# Patient Record
Sex: MALE | Race: WHITE | HISPANIC OR LATINO | Employment: FULL TIME | URBAN - METROPOLITAN AREA
[De-identification: names, ages, dates, MRNs, and addresses within clinical notes are randomized per-mention and may not be internally consistent; named-entity substitution may affect disease eponyms.]

---

## 2021-01-18 ENCOUNTER — APPOINTMENT (OUTPATIENT)
Dept: RADIOLOGY | Facility: MEDICAL CENTER | Age: 36
DRG: 896 | End: 2021-01-18
Attending: EMERGENCY MEDICINE
Payer: COMMERCIAL

## 2021-01-18 ENCOUNTER — HOSPITAL ENCOUNTER (INPATIENT)
Facility: MEDICAL CENTER | Age: 36
LOS: 1 days | DRG: 896 | End: 2021-01-19
Attending: EMERGENCY MEDICINE | Admitting: STUDENT IN AN ORGANIZED HEALTH CARE EDUCATION/TRAINING PROGRAM
Payer: COMMERCIAL

## 2021-01-18 ENCOUNTER — APPOINTMENT (OUTPATIENT)
Dept: RADIOLOGY | Facility: MEDICAL CENTER | Age: 36
DRG: 896 | End: 2021-01-18
Attending: INTERNAL MEDICINE
Payer: COMMERCIAL

## 2021-01-18 PROBLEM — J96.02 ACUTE RESPIRATORY FAILURE WITH HYPOXIA AND HYPERCAPNIA (HCC): Status: ACTIVE | Noted: 2021-01-18

## 2021-01-18 PROBLEM — F10.920 ACUTE ALCOHOLIC INTOXICATION WITHOUT COMPLICATION (HCC): Status: ACTIVE | Noted: 2021-01-18

## 2021-01-18 PROBLEM — J96.01 ACUTE RESPIRATORY FAILURE WITH HYPOXIA AND HYPERCAPNIA (HCC): Status: ACTIVE | Noted: 2021-01-18

## 2021-01-18 LAB
ACTION RANGE TRIGGERED IACRT: YES
ALBUMIN SERPL BCP-MCNC: 4.3 G/DL (ref 3.2–4.9)
ALBUMIN/GLOB SERPL: 1.2 G/DL
ALP SERPL-CCNC: 56 U/L (ref 30–99)
ALT SERPL-CCNC: 22 U/L (ref 2–50)
AMPHET UR QL SCN: NEGATIVE
ANION GAP SERPL CALC-SCNC: 13 MMOL/L (ref 7–16)
AST SERPL-CCNC: 21 U/L (ref 12–45)
BARBITURATES UR QL SCN: NEGATIVE
BASE EXCESS BLDA CALC-SCNC: -3 MMOL/L (ref -4–3)
BASOPHILS # BLD AUTO: 0.8 % (ref 0–1.8)
BASOPHILS # BLD: 0.05 K/UL (ref 0–0.12)
BENZODIAZ UR QL SCN: NEGATIVE
BILIRUB SERPL-MCNC: <0.2 MG/DL (ref 0.1–1.5)
BODY TEMPERATURE: ABNORMAL DEGREES
BUN SERPL-MCNC: 8 MG/DL (ref 8–22)
BZE UR QL SCN: NEGATIVE
CALCIUM SERPL-MCNC: 8.6 MG/DL (ref 8.5–10.5)
CANNABINOIDS UR QL SCN: NEGATIVE
CHLORIDE SERPL-SCNC: 100 MMOL/L (ref 96–112)
CO2 BLDA-SCNC: 26 MMOL/L (ref 20–33)
CO2 SERPL-SCNC: 25 MMOL/L (ref 20–33)
CREAT SERPL-MCNC: 0.61 MG/DL (ref 0.5–1.4)
EOSINOPHIL # BLD AUTO: 0.1 K/UL (ref 0–0.51)
EOSINOPHIL NFR BLD: 1.6 % (ref 0–6.9)
ERYTHROCYTE [DISTWIDTH] IN BLOOD BY AUTOMATED COUNT: 47.8 FL (ref 35.9–50)
ETHANOL BLD-MCNC: 336.5 MG/DL (ref 0–10)
GLOBULIN SER CALC-MCNC: 3.6 G/DL (ref 1.9–3.5)
GLUCOSE SERPL-MCNC: 128 MG/DL (ref 65–99)
HCO3 BLDA-SCNC: 24.8 MMOL/L (ref 17–25)
HCT VFR BLD AUTO: 45.9 % (ref 42–52)
HGB BLD-MCNC: 15.6 G/DL (ref 14–18)
HOROWITZ INDEX BLDA+IHG-RTO: 232 MM[HG]
IMM GRANULOCYTES # BLD AUTO: 0.09 K/UL (ref 0–0.11)
IMM GRANULOCYTES NFR BLD AUTO: 1.4 % (ref 0–0.9)
INST. QUALIFIED PATIENT IIQPT: YES
LYMPHOCYTES # BLD AUTO: 3.22 K/UL (ref 1–4.8)
LYMPHOCYTES NFR BLD: 50.4 % (ref 22–41)
MCH RBC QN AUTO: 31.3 PG (ref 27–33)
MCHC RBC AUTO-ENTMCNC: 34 G/DL (ref 33.7–35.3)
MCV RBC AUTO: 92 FL (ref 81.4–97.8)
METHADONE UR QL SCN: NEGATIVE
MONOCYTES # BLD AUTO: 0.49 K/UL (ref 0–0.85)
MONOCYTES NFR BLD AUTO: 7.7 % (ref 0–13.4)
NEUTROPHILS # BLD AUTO: 2.44 K/UL (ref 1.82–7.42)
NEUTROPHILS NFR BLD: 38.1 % (ref 44–72)
NRBC # BLD AUTO: 0 K/UL
NRBC BLD-RTO: 0 /100 WBC
O2/TOTAL GAS SETTING VFR VENT: 50 %
OPIATES UR QL SCN: NEGATIVE
OXYCODONE UR QL SCN: NEGATIVE
PCO2 BLDA: 53.4 MMHG (ref 26–37)
PCP UR QL SCN: NEGATIVE
PH BLDA: 7.28 [PH] (ref 7.4–7.5)
PLATELET # BLD AUTO: 232 K/UL (ref 164–446)
PMV BLD AUTO: 9.4 FL (ref 9–12.9)
PO2 BLDA: 116 MMHG (ref 64–87)
POTASSIUM SERPL-SCNC: 3.9 MMOL/L (ref 3.6–5.5)
PROPOXYPH UR QL SCN: NEGATIVE
PROT SERPL-MCNC: 7.9 G/DL (ref 6–8.2)
RBC # BLD AUTO: 4.99 M/UL (ref 4.7–6.1)
SAO2 % BLDA: 98 % (ref 93–99)
SARS-COV-2 RNA RESP QL NAA+PROBE: NOTDETECTED
SODIUM SERPL-SCNC: 138 MMOL/L (ref 135–145)
SPECIMEN DRAWN FROM PATIENT: ABNORMAL
SPECIMEN SOURCE: NORMAL
WBC # BLD AUTO: 6.4 K/UL (ref 4.8–10.8)

## 2021-01-18 PROCEDURE — U0005 INFEC AGEN DETEC AMPLI PROBE: HCPCS

## 2021-01-18 PROCEDURE — 700101 HCHG RX REV CODE 250: Performed by: EMERGENCY MEDICINE

## 2021-01-18 PROCEDURE — 71045 X-RAY EXAM CHEST 1 VIEW: CPT

## 2021-01-18 PROCEDURE — 51702 INSERT TEMP BLADDER CATH: CPT

## 2021-01-18 PROCEDURE — 700111 HCHG RX REV CODE 636 W/ 250 OVERRIDE (IP): Performed by: INTERNAL MEDICINE

## 2021-01-18 PROCEDURE — 96365 THER/PROPH/DIAG IV INF INIT: CPT

## 2021-01-18 PROCEDURE — 303105 HCHG CATHETER EXTRA

## 2021-01-18 PROCEDURE — 99292 CRITICAL CARE ADDL 30 MIN: CPT | Performed by: INTERNAL MEDICINE

## 2021-01-18 PROCEDURE — 96366 THER/PROPH/DIAG IV INF ADDON: CPT

## 2021-01-18 PROCEDURE — 80307 DRUG TEST PRSMV CHEM ANLYZR: CPT

## 2021-01-18 PROCEDURE — 700111 HCHG RX REV CODE 636 W/ 250 OVERRIDE (IP)

## 2021-01-18 PROCEDURE — U0003 INFECTIOUS AGENT DETECTION BY NUCLEIC ACID (DNA OR RNA); SEVERE ACUTE RESPIRATORY SYNDROME CORONAVIRUS 2 (SARS-COV-2) (CORONAVIRUS DISEASE [COVID-19]), AMPLIFIED PROBE TECHNIQUE, MAKING USE OF HIGH THROUGHPUT TECHNOLOGIES AS DESCRIBED BY CMS-2020-01-R: HCPCS

## 2021-01-18 PROCEDURE — 96368 THER/DIAG CONCURRENT INF: CPT

## 2021-01-18 PROCEDURE — 85025 COMPLETE CBC W/AUTO DIFF WBC: CPT

## 2021-01-18 PROCEDURE — 304538 HCHG NG TUBE

## 2021-01-18 PROCEDURE — 80053 COMPREHEN METABOLIC PANEL: CPT

## 2021-01-18 PROCEDURE — 36600 WITHDRAWAL OF ARTERIAL BLOOD: CPT

## 2021-01-18 PROCEDURE — 82077 ASSAY SPEC XCP UR&BREATH IA: CPT

## 2021-01-18 PROCEDURE — 700105 HCHG RX REV CODE 258: Performed by: EMERGENCY MEDICINE

## 2021-01-18 PROCEDURE — 770022 HCHG ROOM/CARE - ICU (200)

## 2021-01-18 PROCEDURE — A9270 NON-COVERED ITEM OR SERVICE: HCPCS | Performed by: INTERNAL MEDICINE

## 2021-01-18 PROCEDURE — 94002 VENT MGMT INPAT INIT DAY: CPT

## 2021-01-18 PROCEDURE — 99291 CRITICAL CARE FIRST HOUR: CPT | Performed by: INTERNAL MEDICINE

## 2021-01-18 PROCEDURE — 700102 HCHG RX REV CODE 250 W/ 637 OVERRIDE(OP): Performed by: INTERNAL MEDICINE

## 2021-01-18 PROCEDURE — 302214 INTUBATION BOX: Performed by: EMERGENCY MEDICINE

## 2021-01-18 PROCEDURE — 99223 1ST HOSP IP/OBS HIGH 75: CPT | Performed by: STUDENT IN AN ORGANIZED HEALTH CARE EDUCATION/TRAINING PROGRAM

## 2021-01-18 PROCEDURE — 31500 INSERT EMERGENCY AIRWAY: CPT

## 2021-01-18 PROCEDURE — 82803 BLOOD GASES ANY COMBINATION: CPT

## 2021-01-18 PROCEDURE — 700101 HCHG RX REV CODE 250: Performed by: INTERNAL MEDICINE

## 2021-01-18 PROCEDURE — 99291 CRITICAL CARE FIRST HOUR: CPT

## 2021-01-18 PROCEDURE — 36415 COLL VENOUS BLD VENIPUNCTURE: CPT

## 2021-01-18 PROCEDURE — 96375 TX/PRO/DX INJ NEW DRUG ADDON: CPT

## 2021-01-18 RX ORDER — BISACODYL 10 MG
10 SUPPOSITORY, RECTAL RECTAL
Status: DISCONTINUED | OUTPATIENT
Start: 2021-01-18 | End: 2021-01-19 | Stop reason: HOSPADM

## 2021-01-18 RX ORDER — GAUZE BANDAGE 2" X 2"
100 BANDAGE TOPICAL DAILY
Status: DISCONTINUED | OUTPATIENT
Start: 2021-01-19 | End: 2021-01-19 | Stop reason: HOSPADM

## 2021-01-18 RX ORDER — ONDANSETRON 2 MG/ML
INJECTION INTRAMUSCULAR; INTRAVENOUS
Status: COMPLETED
Start: 2021-01-18 | End: 2021-01-18

## 2021-01-18 RX ORDER — POLYETHYLENE GLYCOL 3350 17 G/17G
1 POWDER, FOR SOLUTION ORAL
Status: DISCONTINUED | OUTPATIENT
Start: 2021-01-18 | End: 2021-01-19 | Stop reason: HOSPADM

## 2021-01-18 RX ORDER — AMOXICILLIN 250 MG
2 CAPSULE ORAL 2 TIMES DAILY
Status: DISCONTINUED | OUTPATIENT
Start: 2021-01-18 | End: 2021-01-19 | Stop reason: HOSPADM

## 2021-01-18 RX ORDER — ONDANSETRON 2 MG/ML
4 INJECTION INTRAMUSCULAR; INTRAVENOUS EVERY 4 HOURS PRN
Status: DISCONTINUED | OUTPATIENT
Start: 2021-01-18 | End: 2021-01-19 | Stop reason: HOSPADM

## 2021-01-18 RX ORDER — FAMOTIDINE 20 MG/1
20 TABLET, FILM COATED ORAL EVERY 12 HOURS
Status: DISCONTINUED | OUTPATIENT
Start: 2021-01-18 | End: 2021-01-19

## 2021-01-18 RX ORDER — ROCURONIUM BROMIDE 10 MG/ML
50 INJECTION, SOLUTION INTRAVENOUS ONCE
Status: COMPLETED | OUTPATIENT
Start: 2021-01-18 | End: 2021-01-18

## 2021-01-18 RX ORDER — FOLIC ACID 1 MG/1
1 TABLET ORAL DAILY
Status: DISCONTINUED | OUTPATIENT
Start: 2021-01-19 | End: 2021-01-19 | Stop reason: HOSPADM

## 2021-01-18 RX ORDER — SODIUM CHLORIDE 9 MG/ML
1000 INJECTION, SOLUTION INTRAVENOUS ONCE
Status: COMPLETED | OUTPATIENT
Start: 2021-01-18 | End: 2021-01-18

## 2021-01-18 RX ORDER — PROPOFOL 10 MG/ML
100 INJECTION, EMULSION INTRAVENOUS ONCE
Status: COMPLETED | OUTPATIENT
Start: 2021-01-18 | End: 2021-01-18

## 2021-01-18 RX ORDER — ACETAMINOPHEN 325 MG/1
650 TABLET ORAL EVERY 4 HOURS PRN
Status: DISCONTINUED | OUTPATIENT
Start: 2021-01-18 | End: 2021-01-19 | Stop reason: HOSPADM

## 2021-01-18 RX ORDER — IPRATROPIUM BROMIDE AND ALBUTEROL SULFATE 2.5; .5 MG/3ML; MG/3ML
3 SOLUTION RESPIRATORY (INHALATION)
Status: DISCONTINUED | OUTPATIENT
Start: 2021-01-18 | End: 2021-01-19 | Stop reason: HOSPADM

## 2021-01-18 RX ADMIN — ROCURONIUM BROMIDE 50 MG: 10 INJECTION, SOLUTION INTRAVENOUS at 04:32

## 2021-01-18 RX ADMIN — ENOXAPARIN SODIUM 40 MG: 40 INJECTION SUBCUTANEOUS at 10:04

## 2021-01-18 RX ADMIN — SODIUM CHLORIDE 1000 ML: 9 INJECTION, SOLUTION INTRAVENOUS at 04:00

## 2021-01-18 RX ADMIN — THIAMINE HYDROCHLORIDE: 100 INJECTION, SOLUTION INTRAMUSCULAR; INTRAVENOUS at 06:07

## 2021-01-18 RX ADMIN — FENTANYL CITRATE 100 MCG: 50 INJECTION, SOLUTION INTRAMUSCULAR; INTRAVENOUS at 11:40

## 2021-01-18 RX ADMIN — PROPOFOL 40 MCG/KG/MIN: 10 INJECTION, EMULSION INTRAVENOUS at 07:32

## 2021-01-18 RX ADMIN — ACETAMINOPHEN 650 MG: 325 TABLET, FILM COATED ORAL at 17:47

## 2021-01-18 RX ADMIN — ONDANSETRON 4 MG: 2 INJECTION INTRAMUSCULAR; INTRAVENOUS at 17:55

## 2021-01-18 RX ADMIN — FAMOTIDINE 20 MG: 10 INJECTION INTRAVENOUS at 17:14

## 2021-01-18 RX ADMIN — FAMOTIDINE 20 MG: 10 INJECTION INTRAVENOUS at 06:07

## 2021-01-18 RX ADMIN — FENTANYL CITRATE 50 MCG: 50 INJECTION, SOLUTION INTRAMUSCULAR; INTRAVENOUS at 08:11

## 2021-01-18 RX ADMIN — FENTANYL CITRATE 100 MCG: 50 INJECTION, SOLUTION INTRAMUSCULAR; INTRAVENOUS at 10:03

## 2021-01-18 RX ADMIN — PROPOFOL 40 MCG/KG/MIN: 10 INJECTION, EMULSION INTRAVENOUS at 11:33

## 2021-01-18 RX ADMIN — PROPOFOL 100 MG: 10 INJECTION, EMULSION INTRAVENOUS at 04:31

## 2021-01-18 RX ADMIN — PROPOFOL 40 MCG/KG/MIN: 10 INJECTION, EMULSION INTRAVENOUS at 04:45

## 2021-01-18 NOTE — ED TRIAGE NOTES
Patient found intoxicated at the [place].  Patient received 4mg zofran after vomiting.  Patient wakes up to speech.

## 2021-01-18 NOTE — RESPIRATORY CARE
Extubation       pt extubated per MD; cuff leak present and no stridor noted. pt place on 4LNC

## 2021-01-18 NOTE — CONSULTS
Critical Care Consultation    Date of consult: 1/18/2021    Referring Physician  Pushpa Handley M.D.    Reason for Consultation  Ventilator management for acute hypoxic and hypercapnic respiratory failure    History of Presenting Illness  All history was obtained from ER staff since the patient was intubated at the time of my evaluation.    Mr. Royal is a 35 year old male who was found highly intoxicated at the [facility] and brought in to the ER for evaluation.  While in the ER, he would wake to verbal and noxious stimuli, but was unable to stay awake.  He would have significant desaturations of his oxygen into the 80s when he was sleeping.  The patient had several episodes of emesis with the altered mental status prompting the ER physician to acutely intubate the patient for airway protection and hypoxia.    Code Status  FULL CODE    Review of Systems  Review of Systems   Unable to perform ROS: Intubated       Past Medical History  Unable to obtain as the patient is intubated  Surgical History  Unable to obtain as the patient is intubated  Family History  Unable to obtain as the patient is intubated  Social History    Unable to obtain as the patient is intubated    Medications  Home Medications    **Home medications have not yet been reviewed for this encounter**       Current Facility-Administered Medications   Medication Dose Route Frequency Provider Last Rate Last Admin   • NS infusion 1,000 mL  1,000 mL Intravenous Once Pushpa Handley M.D.       • PROPOFOL 10 MG/ML IV EMUL            • rocuronium (ZEMURON) injection 50 mg  50 mg Intravenous Once Pushpa Handley M.D.       • propofol (DIPRIVAN) injection  100 mg Intravenous Once Pushpa Handley M.D.       • propofol (DIPRIVAN) injection  0-80 mcg/kg/min Intravenous Continuous Pushpa Handley M.D.       • Respiratory Therapy Consult   Nebulization Continuous RT Haylie Quinn M.D.       • ipratropium-albuterol (DUONEB) nebulizer solution  3 mL  Nebulization Q2HRS PRN (RT) Haylie Quinn M.D.       • famotidine (PEPCID) tablet 20 mg  20 mg Enteral Tube Q12HRS Haylie Quinn M.D.        Or   • famotidine (PEPCID) injection 20 mg  20 mg Intravenous Q12HRS Haylie Quinn M.D.       • senna-docusate (PERICOLACE or SENOKOT S) 8.6-50 MG per tablet 2 Tab  2 Tab Enteral Tube BID Haylie Quinn M.D.        And   • polyethylene glycol/lytes (MIRALAX) PACKET 1 Packet  1 Packet Enteral Tube QDAY PRN Haylie Quinn M.D.        And   • magnesium hydroxide (MILK OF MAGNESIA) suspension 30 mL  30 mL Enteral Tube QDAY PRN Haylie Quinn M.D.        And   • bisacodyl (DULCOLAX) suppository 10 mg  10 mg Rectal QDAY PRN Haylie Quinn M.D.       • MD Alert...ICU Electrolyte Replacement per Pharmacy   Other PHARMACY TO DOSE Haylie Quinn M.D.       • lidocaine (XYLOCAINE) 1 % injection 1-2 mL  1-2 mL Tracheal Tube Q30 MIN PRN Haylie Quinn M.D.       • fentaNYL (SUBLIMAZE) injection 50 mcg  50 mcg Intravenous Q15 MIN PRN Haylie Quinn M.D.        And   • fentaNYL (SUBLIMAZE) injection 100 mcg  100 mcg Intravenous Q15 MIN PRN Haylie Quinn M.D.        And   • fentaNYL (SUBLIMAZE) 50 mcg/mL in 50mL (Continuous Infusion)   Intravenous Continuous Haylie Quinn M.D.        And   • propofol (DIPRIVAN) injection  0-40 mcg/kg/min Intravenous Continuous Haylie Quinn M.D.         No current outpatient medications on file.       Allergies  No Known Allergies    Vital Signs last 24 hours  Temp:  [35.8 °C (96.4 °F)] 35.8 °C (96.4 °F)  Pulse:  [102-108] 108  Resp:  [11-25] 25  BP: (151)/(88) 151/88  SpO2:  [100 %] 100 %    Physical Exam  Physical Exam  Vitals signs and nursing note reviewed.   Constitutional:       General: He is not in acute distress.     Appearance: He is obese. He is ill-appearing. He is not toxic-appearing.      Comments: Smells of vomitus, appears stated age   HENT:      Head: Normocephalic and atraumatic.      Right Ear:  External ear normal.      Left Ear: External ear normal.      Nose: Nose normal. No rhinorrhea.      Mouth/Throat:      Mouth: Mucous membranes are moist.      Comments: ETT in place  Eyes:      General: No scleral icterus.     Pupils: Pupils are equal, round, and reactive to light.      Comments: Injected conjunctivae   Neck:      Musculoskeletal: Normal range of motion and neck supple.   Cardiovascular:      Rate and Rhythm: Regular rhythm. Tachycardia present.      Pulses: Normal pulses.      Heart sounds: Normal heart sounds. No murmur.   Pulmonary:      Effort: No respiratory distress.      Breath sounds: Normal breath sounds. No wheezing.      Comments: Breathing comfortably on the vent  Chest:      Chest wall: No tenderness.   Abdominal:      General: There is no distension.      Palpations: Abdomen is soft.      Tenderness: There is no abdominal tenderness. There is no guarding or rebound.   Musculoskeletal: Normal range of motion.      Right lower leg: No edema.      Left lower leg: No edema.   Lymphadenopathy:      Cervical: No cervical adenopathy.   Skin:     General: Skin is warm.      Capillary Refill: Capillary refill takes less than 2 seconds.   Neurological:      Cranial Nerves: No cranial nerve deficit.      Sensory: No sensory deficit.      Motor: No weakness.      Comments: Moving all x 4, symmetrical facial expressions   Psychiatric:      Comments: Unable to assess         Fluids  No intake or output data in the 24 hours ending 01/18/21 0527    Laboratory  Recent Results (from the past 48 hour(s))   CBC WITH DIFFERENTIAL    Collection Time: 01/18/21  3:36 AM   Result Value Ref Range    WBC 6.4 4.8 - 10.8 K/uL    RBC 4.99 4.70 - 6.10 M/uL    Hemoglobin 15.6 14.0 - 18.0 g/dL    Hematocrit 45.9 42.0 - 52.0 %    MCV 92.0 81.4 - 97.8 fL    MCH 31.3 27.0 - 33.0 pg    MCHC 34.0 33.7 - 35.3 g/dL    RDW 47.8 35.9 - 50.0 fL    Platelet Count 232 164 - 446 K/uL    MPV 9.4 9.0 - 12.9 fL    Neutrophils-Polys  38.10 (L) 44.00 - 72.00 %    Lymphocytes 50.40 (H) 22.00 - 41.00 %    Monocytes 7.70 0.00 - 13.40 %    Eosinophils 1.60 0.00 - 6.90 %    Basophils 0.80 0.00 - 1.80 %    Immature Granulocytes 1.40 (H) 0.00 - 0.90 %    Nucleated RBC 0.00 /100 WBC    Neutrophils (Absolute) 2.44 1.82 - 7.42 K/uL    Lymphs (Absolute) 3.22 1.00 - 4.80 K/uL    Monos (Absolute) 0.49 0.00 - 0.85 K/uL    Eos (Absolute) 0.10 0.00 - 0.51 K/uL    Baso (Absolute) 0.05 0.00 - 0.12 K/uL    Immature Granulocytes (abs) 0.09 0.00 - 0.11 K/uL    NRBC (Absolute) 0.00 K/uL   COMP METABOLIC PANEL    Collection Time: 01/18/21  3:36 AM   Result Value Ref Range    Sodium 138 135 - 145 mmol/L    Potassium 3.9 3.6 - 5.5 mmol/L    Chloride 100 96 - 112 mmol/L    Co2 25 20 - 33 mmol/L    Anion Gap 13.0 7.0 - 16.0    Glucose 128 (H) 65 - 99 mg/dL    Bun 8 8 - 22 mg/dL    Creatinine 0.61 0.50 - 1.40 mg/dL    Calcium 8.6 8.5 - 10.5 mg/dL    AST(SGOT) 21 12 - 45 U/L    ALT(SGPT) 22 2 - 50 U/L    Alkaline Phosphatase 56 30 - 99 U/L    Total Bilirubin <0.2 0.1 - 1.5 mg/dL    Albumin 4.3 3.2 - 4.9 g/dL    Total Protein 7.9 6.0 - 8.2 g/dL    Globulin 3.6 (H) 1.9 - 3.5 g/dL    A-G Ratio 1.2 g/dL   DIAGNOSTIC ALCOHOL    Collection Time: 01/18/21  3:36 AM   Result Value Ref Range    Diagnostic Alcohol 336.5 (H) 0.0 - 10.0 mg/dL   ESTIMATED GFR    Collection Time: 01/18/21  3:36 AM   Result Value Ref Range    GFR If African American >60 >60 mL/min/1.73 m 2    GFR If Non African American >60 >60 mL/min/1.73 m 2       Imaging  CXR still pending    Assessment/Plan  * Acute respiratory failure with hypoxia and hypercapnia (HCC)- (present on admission)  Assessment & Plan  Intubated in ER due to hypoxia and not protecting airway  Cont full vent support  RT/O2 protocols  Vent bundle protocols  I am actively adjusting vent settings based on ABGs  SATs/SBTs  **?of underlying LEONIE    Acute alcoholic intoxication without complication (HCC)- (present on admission)  Assessment &  Plan  Unknown amount tonight and vomiting in ER  Monitor for alcohol withdrawal  Replete electrolytes as needed  Ana garcias      Discussed patient condition and risk of morbidity and/or mortality with Hospitalist, RN, RT and Pharmacy.    The patient remains critically ill requiring active adjustment of the ventilator and monitoring for alcohol withdrawal.  Critical care time = 40 minutes in directly providing and coordinating critical care and extensive data review.  No time overlap and excludes procedures.

## 2021-01-18 NOTE — ASSESSMENT & PLAN NOTE
Intubated in ER due to hypoxia and not protecting airway  Cont full vent support  RT/O2 protocols  Vent bundle protocols  I am actively adjusting vent settings based on ABGs  SATs/SBTs  **?of underlying LEONIE

## 2021-01-18 NOTE — ED NOTES
Clothing covered in vomit, removed and placed in belonging bag, necklace placed in biohazard bag and placed in bag with clothing.

## 2021-01-18 NOTE — ED PROVIDER NOTES
"ED Provider Note    CHIEF COMPLAINT  Chief Complaint   Patient presents with   • Alcohol Intoxication       HPI  Yomi Royal is a 35 y.o. male who presents to the emergency department with EMS for alcohol intoxication.  He was found drunk outside of the [facility] as he had a witnessed mild fall he did not hit his head security kind of helped him to the ground but they were unable to get him to stand on his own so EMS picked him up.  He has not been really responsive for them he will open his eyes to his name but that is about it he had a large volume emesis prior to arrival.    He will open his eyes here for me but is not talking he is given kind of yes and no answers no saying that he does not drink daily and that he does not take any medicines or have any allergies but other than that I did not get much history    REVIEW OF SYSTEMS  History limited secondary to alcohol intoxication  All other review of systems are negative    PAST MEDICAL HISTORY   Unable to assess    SOCIAL HISTORY unable to assess  Social History     Tobacco Use   • Smoking status: Not on file   Substance and Sexual Activity   • Alcohol use: Not on file   • Drug use: Not on file   • Sexual activity: Not on file       SURGICAL HISTORY  patient denies any surgical history unable to assess    CURRENT MEDICATIONS  Home Medications    **Home medications have not yet been reviewed for this encounter**     Unable to assess    ALLERGIES  Not on File    PHYSICAL EXAM  VITAL SIGNS: /88   Pulse (!) 102   Temp (!) 35.8 °C (96.4 °F) (Temporal)   Resp (!) 11   Ht 1.676 m (5' 6\")   Wt 113.4 kg (250 lb)   SpO2 100%   BMI 40.35 kg/m²    Pulse ox interpretation: I interpret this pulse ox as normal.  Constitutional: Disheveled covered in emesis smells highly of alcohol and somnolent  HENT: Normocephalic atraumatic, MMM  Eyes: PER, Conjunctiva normal, Non-icteric.   Neck: Normal range of motion, No tenderness, Supple, No stridor.  Snoring " respirations  Cardiovascular: Mildly tachycardic no murmurs.   Thorax & Lungs: Normal breath sounds, No respiratory distress, No wheezing, No chest tenderness.   Abdomen: Bowel sounds normal, Soft, No tenderness, No pulsatile masses. No peritoneal signs.  Skin: Warm, Dry, No erythema, No rash.   Back: No bony tenderness, No CVA tenderness.   Extremities/MSK: Intact equal distal pulses, No edema, No tenderness, No cyanosis, no major deformities noted  Neurologic: Somnolent will arouse to voice with tactile stimuli moving all extremities      DIFFERENTIAL DIAGNOSIS AND WORK UP PLAN    This is a 35 y.o. male who presents with alcohol intoxication with emesis prior to arrival he is not really talking he does have snoring somnolent episodes he is on nasal cannula because he was desatting when he felt very much asleep for EMS it could be concern for possible aspiration or just sleep apnea based on his body habitus  Were going to keep him on nasal cannula and advance as needed    DIAGNOSTIC STUDIES / PROCEDURES    EKG  No results found for this or any previous visit.    LABS  Pertinent Lab Findings  CBC within normal limits, CMP with glucose 128 without evidence of DKA diagnostic alcohol 336      RADIOLOGY  DX-CHEST-PORTABLE (1 VIEW)   Final Result         1.  Interstitial pulmonary parenchymal prominence, compatible with interstitial edema and/or infiltrates.        The radiologist's interpretation of all radiological studies have been reviewed by me.    Intubation Procedure    Indication: impending respiratory failure, comatose state and airway protection    Consent: Unable to be obtained due to the emergent nature of this procedure.    Medications Used: propofol intravenously and rocuronium intravenously    Procedure: The patient was placed in the appropriate position.  Cricoid pressure was not required.  Intubation was performed by direct laryngoscopy using a laryngoscope and an 8.0 cuffed endotracheal tube.  The cuff  was then inflated and the tube was secured appropriately at a distance of 24 cm to the dental ridge.  Initial confirmation of placement included bilateral breath sounds, an end tidal CO2 detector, absence of sounds over the stomach, tube fogging and adequate chest rise.  A chest x-ray to verify correct placement of the tube showed appropriate tube position.    The patient tolerated the procedure well.     Complications: mild desaturation to the mid 80's         COURSE & MEDICAL DECISION MAKING  Pertinent Labs & Imaging studies reviewed. (See chart for details)    3:57 AM  Reassessed patient the bedside he has been desaturating in the 50s and 60s he is all the way up to 15 L through his nasal cannula at this time were going to upgrade to nasal trumpet a bolster behind the shoulder blade so he can extend his neck and an oxy mask.    4:18 AM  Unfortunately the patient continues to desaturate despite all of our interventions down to the 50s and 60s he is still quite a bit somnolent not talking I am concerned about airway management and desaturations.  Right appropriate for intubation at this time    4:39 AM  I spoke with Dr. Quinn with the pulmonary service will consult on him for being intubated      I spoke with Dr. Vaughn with the hospitalist service who is accepted the patient for hospitalization.    I verified that the patient was wearing a mask and I was wearing appropriate PPE every time I entered the room. The patient's mask was on the patient at all times during my encounter except for a brief view of the oropharynx.          FINAL IMPRESSION  1.  Alcohol intoxication  2.  Impending respiratory failure requiring intubation  3.  Possible aspiration    Intubation procedure    Electronically signed by: Pushpa Handley M.D., 1/18/2021 3:31 AM    This dictation has been created using voice recognition software and/or scribes. The accuracy of the dictation is limited by the abilities of the software and the  expertise of the scribes. I expect there may be some errors of grammar and possibly content. I made every attempt to manually correct the errors within my dictation. However, errors related to voice recognition software and/or scribes may still exist and should be interpreted within the appropriate context.

## 2021-01-18 NOTE — PROGRESS NOTES
Day intensivist note    Admitted for alcohol withdrawal and acute hypoxic resp failure.     Sedation with propofol at 40, fentnayl IV prn   Afebrile  Francisco 900cc  On vent support, 30%/8, ABG 7.28/53/116  WBC 6.4  No antibiotics    We did SAT this am and followed with SBT. Pt did well and subsequently got extubated around 1pm.   Pt informed that he visited [place] for the [employer] and had too much alcohol and got drunk. He normally does not drink.     He was recently diagnosed with SARS CoV-2 PCR in 12/25 for which he had mild fatigue and this symptom was resolved before he visited [place].     Post extubation, pt was doing well, alert, oriented.   COVID/SARS CoV-2 PCR here at admission is negative   Can discontinue COVID isolation   Not suspecting any alcohol withdrawal. No need for ativan    Will sign off, please call with questions   D/w Dr. Cao, [facility] medicine.     Critical Care time spent = 37 mins.     Carlos Navarro D.O.

## 2021-01-18 NOTE — ASSESSMENT & PLAN NOTE
Unknown amount tonight and vomiting in ER  Monitor for alcohol withdrawal  Replete electrolytes as needed  Rally bag

## 2021-01-18 NOTE — ED NOTES
Patient having episodes of sleep apnea and dropping into the 60s on 6L nasal cannula.  Attempted NPA and oxy-mask at 15L, patient continues to drop into the 60s.  ERP notified and orders to intubate to protect airway.

## 2021-01-18 NOTE — PROGRESS NOTES
Patient's , Eliud was not notified of patient's admission due to no information on facesheet. RN spoke with Eliud and update given. Eliud stated that patient is from [place] and was just visiting for the weekend. Patient's emergency contact information updated in patient's chart.

## 2021-01-18 NOTE — H&P
Hospital Medicine History & Physical Note    Date of Service  1/18/2021    Primary Care Physician  No primary care provider on file.    Consultants  None    Code Status  No Order    Chief Complaint  Chief Complaint   Patient presents with   • Alcohol Intoxication       History of Presenting Illness  35[facility] to verbal and noxious stimuli only and saturating into 80s while sleeping. He had multiple episodes of emesis with AMS prompting ER to acutely intubate the patient to protect the airway and correct the hypoxia.    Initial labwork shows unremarkable cbc cmp, .5, and VBG of pH 7.275 and pCO2 53.4.    Patient admitted to ICU for ventilator weaning, hold off antibiotics for now.    Review of Systems  ROS  Unable to perform ROS: Intubated  Past Medical History  Unable to obtain as the patient is intubated  Surgical History  Unable to obtain as the patient is intubated  Family History  Unable to obtain as the patient is intubated  Social History    Unable to obtain as the patient is intubated    Allergies  No Known Allergies    Medications  None       Physical Exam  Temp:  [35.8 °C (96.4 °F)] 35.8 °C (96.4 °F)  Pulse:  [102-111] 102  Resp:  [11-28] 24  BP: (100-151)/() 100/62  SpO2:  [94 %-100 %] 95 %    Physical Exam  Constitutional:       General: He is not in acute distress.     Appearance: He is obese. He is ill-appearing. He is not toxic-appearing.      Comments: Smells of vomitus, appears stated age   HENT:      Head: Normocephalic and atraumatic.      Right Ear: External ear normal.      Left Ear: External ear normal.      Nose: Nose normal. No rhinorrhea.      Mouth/Throat:      Mouth: Mucous membranes are moist.      Comments: ETT in place  Eyes:      General: No scleral icterus.     Pupils: Pupils are equal, round, and reactive to light.      Comments: Injected conjunctivae   Neck:      Musculoskeletal: Normal range of motion and neck  supple.   Cardiovascular:      Rate and Rhythm: Regular rhythm. Tachycardia present.      Pulses: Normal pulses.      Heart sounds: Normal heart sounds. No murmur.   Pulmonary:      Effort: No respiratory distress.      Breath sounds: Normal breath sounds. No wheezing.      Comments: Breathing comfortably on the vent  Chest:      Chest wall: No tenderness.   Abdominal:      General: There is no distension.      Palpations: Abdomen is soft.      Tenderness: There is no abdominal tenderness. There is no guarding or rebound.   Musculoskeletal: Normal range of motion.      Right lower leg: No edema.      Left lower leg: No edema.   Lymphadenopathy:      Cervical: No cervical adenopathy.   Skin:     General: Skin is warm.      Capillary Refill: Capillary refill takes less than 2 seconds.   Neurological:      Cranial Nerves: No cranial nerve deficit.      Sensory: No sensory deficit.      Motor: No weakness.      Comments: Moving all x 4, symmetrical facial expressions   Psychiatric:      Comments: Unable to assess      Laboratory:  Recent Labs     01/18/21  0336   WBC 6.4   RBC 4.99   HEMOGLOBIN 15.6   HEMATOCRIT 45.9   MCV 92.0   MCH 31.3   MCHC 34.0   RDW 47.8   PLATELETCT 232   MPV 9.4     Recent Labs     01/18/21  0336   SODIUM 138   POTASSIUM 3.9   CHLORIDE 100   CO2 25   GLUCOSE 128*   BUN 8   CREATININE 0.61   CALCIUM 8.6     Recent Labs     01/18/21  0336   ALTSGPT 22   ASTSGOT 21   ALKPHOSPHAT 56   TBILIRUBIN <0.2   GLUCOSE 128*         No results for input(s): NTPROBNP in the last 72 hours.      No results for input(s): TROPONINT in the last 72 hours.    Imaging:  DX-CHEST-PORTABLE (1 VIEW)   Final Result         1.  Interstitial pulmonary parenchymal prominence, compatible with interstitial edema and/or infiltrates.            Assessment/Plan:  I anticipate this patient will require at least two midnights for appropriate medical management, necessitating inpatient admission.        * Acute respiratory failure  with hypoxia and hypercapnia (HCC)- (present on admission)  Assessment & Plan  Intubated in ER due to hypoxia and not protecting airway  Cont full vent support  RT/O2 protocols  Vent bundle protocols  I am actively adjusting vent settings based on ABGs  SATs/SBTs  **?of underlying LEONIE      Acute alcoholic intoxication without complication (HCC)- (present on admission)  Assessment & Plan  Unknown amount tonight and vomiting in ER  Monitor for alcohol withdrawal  Replete electrolytes as needed  Rally bag

## 2021-01-18 NOTE — ED NOTES
Attending Hospitalist is Juan Ramon BOYD with Dr Morris starting at 0700. Please contact this APRN for orders, updates or questions today.

## 2021-01-18 NOTE — PROGRESS NOTES
Admitted to day.  Initially intubated for airway protection due to Acute Alcohol intoxication. Extubated today.  Requested by Dr Navarro to have Hospitalist take over as attending staff.  Patient to be transferred to medical floor.

## 2021-01-19 ENCOUNTER — APPOINTMENT (OUTPATIENT)
Dept: RADIOLOGY | Facility: MEDICAL CENTER | Age: 36
DRG: 896 | End: 2021-01-19
Attending: INTERNAL MEDICINE
Payer: COMMERCIAL

## 2021-01-19 VITALS
OXYGEN SATURATION: 93 % | HEIGHT: 66 IN | SYSTOLIC BLOOD PRESSURE: 133 MMHG | DIASTOLIC BLOOD PRESSURE: 72 MMHG | BODY MASS INDEX: 40.18 KG/M2 | HEART RATE: 105 BPM | RESPIRATION RATE: 17 BRPM | TEMPERATURE: 98.2 F | WEIGHT: 250 LBS

## 2021-01-19 PROBLEM — J96.02 ACUTE RESPIRATORY FAILURE WITH HYPOXIA AND HYPERCAPNIA (HCC): Status: RESOLVED | Noted: 2021-01-18 | Resolved: 2021-01-19

## 2021-01-19 PROBLEM — F10.920 ACUTE ALCOHOLIC INTOXICATION WITHOUT COMPLICATION (HCC): Status: RESOLVED | Noted: 2021-01-18 | Resolved: 2021-01-19

## 2021-01-19 PROBLEM — J96.01 ACUTE RESPIRATORY FAILURE WITH HYPOXIA AND HYPERCAPNIA (HCC): Status: RESOLVED | Noted: 2021-01-18 | Resolved: 2021-01-19

## 2021-01-19 LAB
ANION GAP SERPL CALC-SCNC: 12 MMOL/L (ref 7–16)
BASOPHILS # BLD AUTO: 0.6 % (ref 0–1.8)
BASOPHILS # BLD: 0.06 K/UL (ref 0–0.12)
BUN SERPL-MCNC: 7 MG/DL (ref 8–22)
CALCIUM SERPL-MCNC: 8.9 MG/DL (ref 8.5–10.5)
CHLORIDE SERPL-SCNC: 100 MMOL/L (ref 96–112)
CO2 SERPL-SCNC: 27 MMOL/L (ref 20–33)
CREAT SERPL-MCNC: 0.68 MG/DL (ref 0.5–1.4)
EOSINOPHIL # BLD AUTO: 0.06 K/UL (ref 0–0.51)
EOSINOPHIL NFR BLD: 0.6 % (ref 0–6.9)
ERYTHROCYTE [DISTWIDTH] IN BLOOD BY AUTOMATED COUNT: 49.7 FL (ref 35.9–50)
GLUCOSE SERPL-MCNC: 97 MG/DL (ref 65–99)
HCT VFR BLD AUTO: 44.6 % (ref 42–52)
HGB BLD-MCNC: 15.2 G/DL (ref 14–18)
IMM GRANULOCYTES # BLD AUTO: 0.05 K/UL (ref 0–0.11)
IMM GRANULOCYTES NFR BLD AUTO: 0.5 % (ref 0–0.9)
LYMPHOCYTES # BLD AUTO: 4.03 K/UL (ref 1–4.8)
LYMPHOCYTES NFR BLD: 38.6 % (ref 22–41)
MAGNESIUM SERPL-MCNC: 1.7 MG/DL (ref 1.5–2.5)
MCH RBC QN AUTO: 31.7 PG (ref 27–33)
MCHC RBC AUTO-ENTMCNC: 34.1 G/DL (ref 33.7–35.3)
MCV RBC AUTO: 93.1 FL (ref 81.4–97.8)
MONOCYTES # BLD AUTO: 1 K/UL (ref 0–0.85)
MONOCYTES NFR BLD AUTO: 9.6 % (ref 0–13.4)
NEUTROPHILS # BLD AUTO: 5.24 K/UL (ref 1.82–7.42)
NEUTROPHILS NFR BLD: 50.1 % (ref 44–72)
NRBC # BLD AUTO: 0 K/UL
NRBC BLD-RTO: 0 /100 WBC
PHOSPHATE SERPL-MCNC: 2 MG/DL (ref 2.5–4.5)
PLATELET # BLD AUTO: 234 K/UL (ref 164–446)
PMV BLD AUTO: 9.4 FL (ref 9–12.9)
POTASSIUM SERPL-SCNC: 3.8 MMOL/L (ref 3.6–5.5)
RBC # BLD AUTO: 4.79 M/UL (ref 4.7–6.1)
SODIUM SERPL-SCNC: 139 MMOL/L (ref 135–145)
WBC # BLD AUTO: 10.4 K/UL (ref 4.8–10.8)

## 2021-01-19 PROCEDURE — 700102 HCHG RX REV CODE 250 W/ 637 OVERRIDE(OP): Performed by: INTERNAL MEDICINE

## 2021-01-19 PROCEDURE — A9270 NON-COVERED ITEM OR SERVICE: HCPCS | Performed by: INTERNAL MEDICINE

## 2021-01-19 PROCEDURE — 85025 COMPLETE CBC W/AUTO DIFF WBC: CPT

## 2021-01-19 PROCEDURE — 700111 HCHG RX REV CODE 636 W/ 250 OVERRIDE (IP): Performed by: INTERNAL MEDICINE

## 2021-01-19 PROCEDURE — 83735 ASSAY OF MAGNESIUM: CPT

## 2021-01-19 PROCEDURE — 80048 BASIC METABOLIC PNL TOTAL CA: CPT

## 2021-01-19 PROCEDURE — 99239 HOSP IP/OBS DSCHRG MGMT >30: CPT | Performed by: HOSPITALIST

## 2021-01-19 PROCEDURE — 71045 X-RAY EXAM CHEST 1 VIEW: CPT

## 2021-01-19 PROCEDURE — 84100 ASSAY OF PHOSPHORUS: CPT

## 2021-01-19 RX ADMIN — THERA TABS 1 TABLET: TAB at 05:09

## 2021-01-19 RX ADMIN — FAMOTIDINE 20 MG: 20 TABLET ORAL at 05:08

## 2021-01-19 RX ADMIN — ACETAMINOPHEN 650 MG: 325 TABLET, FILM COATED ORAL at 05:12

## 2021-01-19 RX ADMIN — Medication 100 MG: at 05:09

## 2021-01-19 RX ADMIN — Medication 1 MG: at 05:08

## 2021-01-19 RX ADMIN — ENOXAPARIN SODIUM 40 MG: 40 INJECTION SUBCUTANEOUS at 05:09

## 2021-01-19 NOTE — PROGRESS NOTES
Patient discharged home via personal vehicle with , Eliud. All belonging packed by and accompanied patient (including ID, wallet, glasses, phone, clothes). All invasive lines and monitoring equipment removed from patient. AVS signed and placed in chart. Copy given to patient. All questions answered.

## 2021-01-19 NOTE — DISCHARGE SUMMARY
Discharge Summary    CHIEF COMPLAINT ON ADMISSION  Chief Complaint   Patient presents with   • Alcohol Intoxication       Reason for Admission  Alcohol intoxication    Admission Date  1/18/2021    CODE STATUS  Full code  HPI & HOSPITAL COURSE  This is a 35 y.o. male here with initial complaints of being brought to the hospital by EMS secondary to a alcohol intoxication, patient was found drunk outside of [facility], the patient was found noncoherent, obtunded, was brought to the emergency room, where the patient was found not to be able to protect his airway, he was therefore admitted to ICU, intubated for protection.  The patient denies prior medical history, he is from [facility].  He is here with his fiancée.  The patient cleared his intoxication fairly rapidly and was able to be extubated without difficulty, the next morning the patient had no particular complaints, he was found mildly hypertensive which he does not give a history of.  He was able to ambulate independently and was able to take food normally.  At this time the patient is safe for discharge to home with close outpatient follow-up, instructions to monitor his outpatient blood pressure and seek medical attention if it remains elevated.  The patient is to follow a regular healthy diet and should refrain from alcohol.    Therefore, he is discharged in good and stable condition to home with close outpatient follow-up.    The patient recovered much more quickly than anticipated on admission.    Discharge Date  1/19/2021    FOLLOW UP ITEMS POST DISCHARGE  PCP follow-up to check on blood pressure    DISCHARGE DIAGNOSES  Principal Problem (Resolved):    Acute respiratory failure with hypoxia and hypercapnia (HCC) POA: Yes  Active Problems:    * No active hospital problems. *  Resolved Problems:    Acute alcoholic intoxication without complication (HCC) POA: Yes      FOLLOW UP  PCP follow-up once back home in [place]  MEDICATIONS ON DISCHARGE      Medication List      You have not been prescribed any medications.         Allergies  No Known Allergies    DIET  Orders Placed This Encounter   Procedures   • Diet Order Diet: Regular     Standing Status:   Standing     Number of Occurrences:   1     Order Specific Question:   Diet:     Answer:   Regular [1]       ACTIVITY  As tolerated.  Weight bearing as tolerated    CONSULTATIONS  Intensivist    PROCEDURES  Intubation and extubation    LABORATORY  Lab Results   Component Value Date    SODIUM 139 01/19/2021    POTASSIUM 3.8 01/19/2021    CHLORIDE 100 01/19/2021    CO2 27 01/19/2021    GLUCOSE 97 01/19/2021    BUN 7 (L) 01/19/2021    CREATININE 0.68 01/19/2021        Lab Results   Component Value Date    WBC 10.4 01/19/2021    HEMOGLOBIN 15.2 01/19/2021    HEMATOCRIT 44.6 01/19/2021    PLATELETCT 234 01/19/2021        Total time of the discharge process exceeds 45 minutes.

## 2021-01-19 NOTE — DISCHARGE INSTRUCTIONS
Discharge Instructions    Discharged to Home by personal vehicle with  (Eliud). Discharged via personal vehicle, hospital escort: O[facility].  Special equipment needed: None    Be sure to schedule a follow-up appointment with your primary care doctor or any specialists as instructed.     Binge-Drinking Information, Adult  Binge-drinking refers to drinking a large amount of alcohol in a short time. This is usually 5 drinks for men or 4 drinks for women, on one occasion.  People who binge-drink do not always have an alcohol problem. However, binge-drinking can raise your risk of becoming dependent on alcohol (having alcohol use disorder). In addition to health problems, such as heart disease, liver disease, or cancer, binge-drinking can also lead to legal, financial, and interpersonal problems. Friends and family may notice signs of binge-drinking or alcohol use disorder before you do.  What lifestyle changes can be made?         · Avoid drinking too much. If you choose to drink, drink slowly, set a limit for yourself, and follow that plan. Aim for a limit of 1 drink a day for nonpregnant women and 2 drinks a day for men. One drink equals 12 oz of beer, 5 oz of wine, or 1½ oz of hard liquor.  · Encourage others around you not to binge-drink.  · Become aware of situations and people who trigger your drinking behavior, and either avoid those or find a new way to deal with them. Find hobbies that you can do instead of drinking, such as exercising or outdoor activities.  · Do not drink if:  ? You are pregnant or trying to become pregnant.  ? You plan to drive a vehicle.  ? You plan to use machinery, such as a  or power tool.  ? You have a health condition that alcohol can make worse.  ? You take medicines that are affected by alcohol, including prescription pain medicines.  ? You are recovering from alcohol use disorder.  · Develop skills to manage your moods and emotions so you do not need to drink to  cope with them. This may include using stress reduction techniques such as:  ? Deep breathing.  ? Meditation or yoga.  ? Exercise or playing sports.  ? Keeping a stress diary.  ? Listening to music.  Why are these changes important?  Binge-drinking can put you at risk for serious health problems, including:  · Accidental injuries, such as alcohol poisoning or falls.  · Violence, such as sexual assault.  · STDs (sexually transmitted diseases).  · Developing alcohol use disorder.  · Mental health problems, such as anxiety or depression.  · Problems with memory or learning.  · Liver disease.  · High blood pressure.  · Heart disease.  · Stroke.  · Cancer of the liver, colon, esophagus, breast, or mouth.  If you binge-drink while pregnant, your baby may also be at risk for health problems, including:  · Miscarriage.  · Stillbirth.  · Fetal alcohol spectrum disorders (FASDs).  · Sudden infant death syndrome (SIDS).  What can happen if changes are not made?  In addition to health problems, binge-drinking can also raise your risk of:  · Car accidents.  · Problems with relationships and other social situations.  · Legal or financial problems.  · Unplanned pregnancies.  What are the benefits of controlling my drinking?  Controlling your drinking or quitting drinking can make you feel better and:  · Help you control your weight.  · Make you more likely to get into good physical shape and stay fit.  · Improve how your body processes vitamins and minerals.  · Improve your health by lowering your blood pressure, cholesterol, triglycerides, and blood sugar (glucose).  · Help you think more clearly and make better decisions.  Where to find support:  You can get support for stopping binge-drinking from:  · Your health care provider. He or she may be able to recommend counseling if you drink too much.  · The National Drug and Alcohol Treatment Referral Service: 0-401-665-HELP (7002)  · Alcoholics Anonymous, Alcoholics Resource  Center: 1-163.458.5697  Where to find more information:  You can find more information about binge-drinking from:  · Substance Abuse and Mental Health Services Administration: www.samhsa.gov  · Alcoholics Resource Center: www.alcoholicsanonymous.com  Contact a health care provider if:  · You cannot control your drinking, or you think that your drinking might be out of your control.  · You have unexpected physical problems that cause you distress, such as accidental injuries.  · You need help with the consequences of your drinking.  Get help right away if:  · You have thoughts about hurting yourself or others.  If you ever feel like you may hurt yourself or others, or have thoughts about taking your own life, get help right away. You can go to your nearest emergency department or call:  · Your local emergency services (911 in the [place]  · A suicide crisis helpline, such as the National Suicide Prevention Lifeline at 1-649.205.9090. This is open 24 hours a day.  Summary  · Binge-drinking refers to drinking a large amount of alcohol in a short time. This is usually 5 drinks for men or 4 drinks for women, on one occasion.  · Binge-drinking can lead to serious health problems, such as heart disease, liver disease, or cancer.  · Binge-drinking raises your risk of developing alcohol dependence (alcohol use disorder) and social and relationship problems.  · Talk with your health care provider about your drinking habits. He or she may be able to recommend counseling if you drink too much.  This information is not intended to replace advice given to you by your health care provider. Make sure you discuss any questions you have with your health care provider.  Document Released: 10/17/2018 Document Revised: 04/08/2020 Document Reviewed: 10/17/2018  Elsevier Patient Education © 2020 [employer]    Discharge Plan:        I understand that a diet low in cholesterol, fat, and sodium is recommended for good health. Unless I have  been given specific instructions below for another diet, I accept this instruction as my diet prescription.   Other diet: n/a    Special Instructions: None    · Is patient discharged on Warfarin / Coumadin?   No    Depression / Suicide Risk    As you are discharged from this [facility] facility, it is important to learn how to keep safe from harming yourself.    Recognize the warning signs:  · Abrupt changes in personality, positive or negative- including increase in energy   · Giving away possessions  · Change in eating patterns- significant weight changes-  positive or negative  · Change in sleeping patterns- unable to sleep or sleeping all the time   · Unwillingness or inability to communicate  · Depression  · Unusual sadness, discouragement and loneliness  · Talk of wanting to die  · Neglect of personal appearance   · Rebelliousness- reckless behavior  · Withdrawal from people/activities they love  · Confusion- inability to concentrate     If you or a loved one observes any of these behaviors or has concerns about self-harm, here's what you can do:  · Talk about it- your feelings and reasons for harming yourself  · Remove any means that you might use to hurt yourself (examples: pills, rope, extension cords, firearm)  · Get professional help from the community (Mental Health, Substance Abuse, psychological counseling)  · Do not be alone:Call your Safe Contact- someone whom you trust who will be there for you.  · Call your local CRISIS HOTLINE 017-1572 or 481-190-9347  · Call your local Children's Mobile Crisis Response Team [place] (736) 070-1414 or www.i-marker  · Call the toll free National Suicide Prevention Hotlines   · National Suicide Prevention Lifeline 949-919-NQSB (9840)  · National Hope Line Network 800-SUICIDE (538-2234)        Physician Discharge Instructions:  Discharge Diagnosis: Alcohol intoxication  Proceed to discharge/transfer  to home  Take Rx/Prescriptions as prescribed and  reconciled per AVS  For OTC Medication consult with your Pharmacist first.  Diet: Regular   Activity: As tolerated   F/u with PCP within 3-10 days at home location, ask for f/u prescriptions by PCP  Patient to monitor blood pressure to assure that hypertension resolves or seek medical treatment  For new, severe symptoms refer to the next Emergency Care or call your Physician.  Controlled Substance History was reviewed if new Rx was issued.    Lavon Farmer MD